# Patient Record
Sex: MALE | Race: WHITE | NOT HISPANIC OR LATINO | URBAN - METROPOLITAN AREA
[De-identification: names, ages, dates, MRNs, and addresses within clinical notes are randomized per-mention and may not be internally consistent; named-entity substitution may affect disease eponyms.]

---

## 2021-02-10 ENCOUNTER — TELEPHONE ENCOUNTER (OUTPATIENT)
Dept: URBAN - METROPOLITAN AREA CLINIC 113 | Facility: CLINIC | Age: 56
End: 2021-02-10

## 2021-02-10 VITALS — HEIGHT: 69 IN | WEIGHT: 190 LBS | BODY MASS INDEX: 28.14 KG/M2

## 2021-02-10 RX ORDER — PANTOPRAZOLE SODIUM 40 MG/1
1 TABLET TABLET, DELAYED RELEASE ORAL ONCE A DAY
Qty: 30 TABLET | Status: ACTIVE | COMMUNITY

## 2021-02-10 RX ORDER — METOPROLOL SUCCINATE 50 MG/1
1 TABLET TABLET, FILM COATED, EXTENDED RELEASE ORAL ONCE A DAY
Qty: 30 TABLET | Status: ACTIVE | COMMUNITY

## 2021-02-10 RX ORDER — LISINOPRIL 2.5 MG/1
1 TABLET TABLET ORAL BID
Qty: 60 TABLET | Status: ACTIVE | COMMUNITY

## 2021-02-10 RX ORDER — ATORVASTATIN CALCIUM 80 MG/1
1 TABLET TABLET, FILM COATED ORAL ONCE A DAY
Qty: 30 TABLET | Status: ACTIVE | COMMUNITY

## 2021-02-10 RX ORDER — FLUOXETINE 40 MG/1
1 CAPSULE CAPSULE ORAL ONCE A DAY
Qty: 30 CAPSULE | Status: ACTIVE | COMMUNITY

## 2021-02-10 RX ORDER — KRILL/OM-3/DHA/EPA/PHOSPHO/AST 1000-230MG
1 TABLET CAPSULE ORAL ONCE A DAY
Qty: 30 TABLET | Status: ACTIVE | COMMUNITY

## 2021-02-10 RX ORDER — SODIUM, POTASSIUM,MAG SULFATES 17.5-3.13G
354 ML SOLUTION, RECONSTITUTED, ORAL ORAL
Qty: 354 ML | Refills: 0 | OUTPATIENT
Start: 2021-02-10 | End: 2021-02-11

## 2021-02-10 RX ORDER — TICAGRELOR 90 MG/1
1 TABLET TABLET ORAL TWICE A DAY
Qty: 60 TABLET | Status: ACTIVE | COMMUNITY

## 2021-02-17 ENCOUNTER — OFFICE VISIT (OUTPATIENT)
Dept: URBAN - METROPOLITAN AREA SURGERY CENTER 25 | Facility: SURGERY CENTER | Age: 56
End: 2021-02-17
Payer: COMMERCIAL

## 2021-02-17 ENCOUNTER — CLAIMS CREATED FROM THE CLAIM WINDOW (OUTPATIENT)
Dept: URBAN - METROPOLITAN AREA CLINIC 4 | Facility: CLINIC | Age: 56
End: 2021-02-17
Payer: COMMERCIAL

## 2021-02-17 DIAGNOSIS — Z12.11 COLON CANCER SCREENING: ICD-10-CM

## 2021-02-17 DIAGNOSIS — K63.5 BENIGN COLON POLYP: ICD-10-CM

## 2021-02-17 DIAGNOSIS — K63.89 OTHER SPECIFIED DISEASES OF INTESTINE: ICD-10-CM

## 2021-02-17 PROCEDURE — 88305 TISSUE EXAM BY PATHOLOGIST: CPT | Performed by: PATHOLOGY

## 2021-02-17 PROCEDURE — 45380 COLONOSCOPY AND BIOPSY: CPT | Performed by: INTERNAL MEDICINE

## 2021-02-17 PROCEDURE — G8907 PT DOC NO EVENTS ON DISCHARG: HCPCS | Performed by: INTERNAL MEDICINE

## 2021-02-17 RX ORDER — ATORVASTATIN CALCIUM 80 MG/1
1 TABLET TABLET, FILM COATED ORAL ONCE A DAY
Qty: 30 TABLET | Status: ACTIVE | COMMUNITY

## 2021-02-17 RX ORDER — METOPROLOL SUCCINATE 50 MG/1
1 TABLET TABLET, FILM COATED, EXTENDED RELEASE ORAL ONCE A DAY
Qty: 30 TABLET | Status: ACTIVE | COMMUNITY

## 2021-02-17 RX ORDER — PANTOPRAZOLE SODIUM 40 MG/1
1 TABLET TABLET, DELAYED RELEASE ORAL ONCE A DAY
Qty: 30 TABLET | Status: ACTIVE | COMMUNITY

## 2021-02-17 RX ORDER — LISINOPRIL 2.5 MG/1
1 TABLET TABLET ORAL BID
Qty: 60 TABLET | Status: ACTIVE | COMMUNITY

## 2021-02-17 RX ORDER — TICAGRELOR 90 MG/1
1 TABLET TABLET ORAL TWICE A DAY
Qty: 60 TABLET | Status: ACTIVE | COMMUNITY

## 2021-02-17 RX ORDER — FLUOXETINE 40 MG/1
1 CAPSULE CAPSULE ORAL ONCE A DAY
Qty: 30 CAPSULE | Status: ACTIVE | COMMUNITY

## 2021-02-17 RX ORDER — KRILL/OM-3/DHA/EPA/PHOSPHO/AST 1000-230MG
1 TABLET CAPSULE ORAL ONCE A DAY
Qty: 30 TABLET | Status: ACTIVE | COMMUNITY

## 2021-03-31 ENCOUNTER — OFFICE VISIT (OUTPATIENT)
Dept: URBAN - METROPOLITAN AREA CLINIC 113 | Facility: CLINIC | Age: 56
End: 2021-03-31

## 2021-03-31 RX ORDER — LISINOPRIL 2.5 MG/1
1 TABLET TABLET ORAL BID
Qty: 60 TABLET | Status: ACTIVE | COMMUNITY

## 2021-03-31 RX ORDER — METOPROLOL SUCCINATE 50 MG/1
1 TABLET TABLET, FILM COATED, EXTENDED RELEASE ORAL ONCE A DAY
Qty: 30 TABLET | Status: ACTIVE | COMMUNITY

## 2021-03-31 RX ORDER — FLUOXETINE 40 MG/1
1 CAPSULE CAPSULE ORAL ONCE A DAY
Qty: 30 CAPSULE | Status: ACTIVE | COMMUNITY

## 2021-03-31 RX ORDER — PANTOPRAZOLE SODIUM 40 MG/1
1 TABLET TABLET, DELAYED RELEASE ORAL ONCE A DAY
Qty: 30 TABLET | Status: ACTIVE | COMMUNITY

## 2021-03-31 RX ORDER — TICAGRELOR 90 MG/1
1 TABLET TABLET ORAL TWICE A DAY
Qty: 60 TABLET | Status: ACTIVE | COMMUNITY

## 2021-03-31 RX ORDER — ATORVASTATIN CALCIUM 80 MG/1
1 TABLET TABLET, FILM COATED ORAL ONCE A DAY
Qty: 30 TABLET | Status: ACTIVE | COMMUNITY

## 2021-03-31 RX ORDER — KRILL/OM-3/DHA/EPA/PHOSPHO/AST 1000-230MG
1 TABLET CAPSULE ORAL ONCE A DAY
Qty: 30 TABLET | Status: ACTIVE | COMMUNITY

## 2022-04-01 ENCOUNTER — OFFICE VISIT (OUTPATIENT)
Dept: URBAN - METROPOLITAN AREA CLINIC 107 | Facility: CLINIC | Age: 57
End: 2022-04-01

## 2022-04-01 RX ORDER — METOPROLOL SUCCINATE 50 MG/1
1 TABLET TABLET, FILM COATED, EXTENDED RELEASE ORAL ONCE A DAY
Qty: 30 TABLET | Status: ACTIVE | COMMUNITY

## 2022-04-01 RX ORDER — LISINOPRIL 2.5 MG/1
1 TABLET TABLET ORAL BID
Qty: 60 TABLET | Status: ACTIVE | COMMUNITY

## 2022-04-01 RX ORDER — ATORVASTATIN CALCIUM 80 MG/1
1 TABLET TABLET, FILM COATED ORAL ONCE A DAY
Qty: 30 TABLET | Status: ACTIVE | COMMUNITY

## 2022-04-01 RX ORDER — PANTOPRAZOLE SODIUM 40 MG/1
1 TABLET TABLET, DELAYED RELEASE ORAL ONCE A DAY
Qty: 30 TABLET | Status: ACTIVE | COMMUNITY

## 2022-04-01 RX ORDER — FLUOXETINE 40 MG/1
1 CAPSULE CAPSULE ORAL ONCE A DAY
Qty: 30 CAPSULE | Status: ACTIVE | COMMUNITY

## 2022-04-01 RX ORDER — TICAGRELOR 90 MG/1
1 TABLET TABLET ORAL TWICE A DAY
Qty: 60 TABLET | Status: ACTIVE | COMMUNITY

## 2022-04-01 RX ORDER — KRILL/OM-3/DHA/EPA/PHOSPHO/AST 1000-230MG
1 TABLET CAPSULE ORAL ONCE A DAY
Qty: 30 TABLET | Status: ACTIVE | COMMUNITY

## 2022-04-01 NOTE — HPI-OTHER HISTORIES
Colonoscopy 2/17/2021:Diverticulosis in the sigmoid and descending colon.  Nonbleeding internal hemorrhoids.  Removal of one 4 mm polyp in the descending colon.  Pathology revealed hyperplastic polyps.  Repeat colonoscopy in 10 years for screening.

## 2022-04-01 NOTE — HPI-TODAY'S VISIT:
56-year-old male presents for evaluation of abdominal pain. Patient underwent umbilical hernia repair with Dr. Gibson in December 2021.  He saw Dr. Gibson on 2/18/2022 with complaints of returning epigastric pain that was present for 4 days surgery.  He suspected it was unrelated to his hernia repair and the patient requested a referral to GI.  Denies any NSAID use states the pain is worse when he is more active and less intense when he is sedentary.  He was referred to us for possible EGD. Labs 12/22/2021:Normal CBC, elevated glucose 123, normal GFR.

## 2022-04-05 ENCOUNTER — OFFICE VISIT (OUTPATIENT)
Dept: URBAN - METROPOLITAN AREA CLINIC 113 | Facility: CLINIC | Age: 57
End: 2022-04-05
Payer: COMMERCIAL

## 2022-04-05 ENCOUNTER — WEB ENCOUNTER (OUTPATIENT)
Dept: URBAN - METROPOLITAN AREA CLINIC 113 | Facility: CLINIC | Age: 57
End: 2022-04-05

## 2022-04-05 VITALS
RESPIRATION RATE: 20 BRPM | SYSTOLIC BLOOD PRESSURE: 137 MMHG | WEIGHT: 213 LBS | HEART RATE: 69 BPM | DIASTOLIC BLOOD PRESSURE: 83 MMHG | BODY MASS INDEX: 31.55 KG/M2 | TEMPERATURE: 97.3 F | HEIGHT: 69 IN

## 2022-04-05 DIAGNOSIS — K21.9 GASTROESOPHAGEAL REFLUX DISEASE, UNSPECIFIED WHETHER ESOPHAGITIS PRESENT: ICD-10-CM

## 2022-04-05 DIAGNOSIS — R10.13 EPIGASTRIC PAIN: ICD-10-CM

## 2022-04-05 PROBLEM — 235595009: Status: ACTIVE | Noted: 2022-04-05

## 2022-04-05 PROCEDURE — 33214 UPGRADE OF PACEMAKER SYSTEM: CPT | Performed by: INTERNAL MEDICINE

## 2022-04-05 PROCEDURE — 99214 OFFICE O/P EST MOD 30 MIN: CPT | Performed by: INTERNAL MEDICINE

## 2022-04-05 PROCEDURE — 99244 OFF/OP CNSLTJ NEW/EST MOD 40: CPT | Performed by: INTERNAL MEDICINE

## 2022-04-05 RX ORDER — SUCRALFATE 1 G
1 TABLET ON AN EMPTY STOMACH TABLET ORAL TWICE A DAY
Qty: 60 | Refills: 3 | OUTPATIENT
Start: 2022-04-05 | End: 2022-08-03

## 2022-04-05 RX ORDER — ATORVASTATIN CALCIUM 80 MG/1
1 TABLET TABLET, FILM COATED ORAL ONCE A DAY
Qty: 30 TABLET | Status: ACTIVE | COMMUNITY

## 2022-04-05 RX ORDER — TICAGRELOR 90 MG/1
1 TABLET TABLET ORAL TWICE A DAY
Qty: 60 TABLET | Status: ACTIVE | COMMUNITY

## 2022-04-05 RX ORDER — METOPROLOL SUCCINATE 50 MG/1
1 TABLET TABLET, FILM COATED, EXTENDED RELEASE ORAL ONCE A DAY
Qty: 30 TABLET | Status: ACTIVE | COMMUNITY

## 2022-04-05 RX ORDER — FLUOXETINE 40 MG/1
1 CAPSULE CAPSULE ORAL ONCE A DAY
Qty: 30 CAPSULE | Status: ON HOLD | COMMUNITY

## 2022-04-05 RX ORDER — KRILL/OM-3/DHA/EPA/PHOSPHO/AST 1000-230MG
1 TABLET CAPSULE ORAL ONCE A DAY
Qty: 30 TABLET | Status: ACTIVE | COMMUNITY

## 2022-04-05 RX ORDER — PANTOPRAZOLE SODIUM 40 MG/1
1 TABLET TABLET, DELAYED RELEASE ORAL ONCE A DAY
OUTPATIENT

## 2022-04-05 RX ORDER — LISINOPRIL 2.5 MG/1
1 TABLET TABLET ORAL BID
Qty: 60 TABLET | Status: ON HOLD | COMMUNITY

## 2022-04-05 RX ORDER — PANTOPRAZOLE SODIUM 40 MG/1
1 TABLET TABLET, DELAYED RELEASE ORAL ONCE A DAY
Qty: 30 TABLET | Status: ACTIVE | COMMUNITY

## 2022-04-05 NOTE — HPI-TODAY'S VISIT:
56-year-old male with a history of diverticulosis, internal hemorrhoids, and CAD with prior MI s/p stent placements on Brillinta is referred by Esme Robin NP for evaluation of abdominal pain. A copy of today's visit will be forwarded to the referring provider.  Patient underwent umbilical hernia and epigastric abdominal hernia repair with Dr. Gibson in December 2021.  He saw Dr. Gibson on 2/18/2022 with complaints of returning epigastric pain that was present for 4 days surgery.  He suspected it was unrelated to his hernia repair and the patient requested a referral to GI.  Denies any NSAID use states the pain is worse when he is more active and less intense when he is sedentary.  He was referred to us for possible EGD.  Labs 12/22/2021:Normal CBC, elevated glucose 123, normal GFR.   Patient admits to epigastric pain ongoing for a year which he prompted him to see Dr. Gibson. His epigastric pain has persisted despite his hernia repair. The pain used to be intermittent and is now constant. The pain will present as a dull, "achey" or sharp sensation. The pain worsens when sitting upright and improves while lying supine. Pain is reproduced upon palpation. Pain also worsens after eating. He has not tried heat or ice application. He denies any associated nausea or vomiting. He does have heartburn which is well controlled on Pantoprazole 40 mg once daily. He denies excessive NSAID use. He denies dysphagia. Bowel movements are regular and daily. No blood per rectum or melena. He denies any family history of colon cancer or other GI malignancy such as colon cancer.  He does have a history of MI s/p stent placement x 5 in 2020. He has not had a cardiovascular evaluation though he denies worsening of pain with exercise, SOB, chest pain, palpitations or syncope.

## 2022-05-24 ENCOUNTER — TELEPHONE ENCOUNTER (OUTPATIENT)
Dept: URBAN - METROPOLITAN AREA CLINIC 113 | Facility: CLINIC | Age: 57
End: 2022-05-24

## 2022-05-31 ENCOUNTER — OFFICE VISIT (OUTPATIENT)
Dept: URBAN - METROPOLITAN AREA CLINIC 113 | Facility: CLINIC | Age: 57
End: 2022-05-31

## 2022-05-31 RX ORDER — FLUOXETINE 40 MG/1
1 CAPSULE CAPSULE ORAL ONCE A DAY
Qty: 30 CAPSULE | Status: ON HOLD | COMMUNITY

## 2022-05-31 RX ORDER — LISINOPRIL 2.5 MG/1
1 TABLET TABLET ORAL BID
Qty: 60 TABLET | Status: ON HOLD | COMMUNITY

## 2022-05-31 RX ORDER — METOPROLOL SUCCINATE 50 MG/1
1 TABLET TABLET, FILM COATED, EXTENDED RELEASE ORAL ONCE A DAY
Qty: 30 TABLET | Status: ACTIVE | COMMUNITY

## 2022-05-31 RX ORDER — KRILL/OM-3/DHA/EPA/PHOSPHO/AST 1000-230MG
1 TABLET CAPSULE ORAL ONCE A DAY
Qty: 30 TABLET | Status: ACTIVE | COMMUNITY

## 2022-05-31 RX ORDER — TICAGRELOR 90 MG/1
1 TABLET TABLET ORAL TWICE A DAY
Qty: 60 TABLET | Status: ACTIVE | COMMUNITY

## 2022-05-31 RX ORDER — ATORVASTATIN CALCIUM 80 MG/1
1 TABLET TABLET, FILM COATED ORAL ONCE A DAY
Qty: 30 TABLET | Status: ACTIVE | COMMUNITY

## 2022-05-31 RX ORDER — PANTOPRAZOLE SODIUM 40 MG/1
1 TABLET TABLET, DELAYED RELEASE ORAL ONCE A DAY
Status: ACTIVE | COMMUNITY

## 2022-05-31 RX ORDER — SUCRALFATE 1 G
1 TABLET ON AN EMPTY STOMACH TABLET ORAL TWICE A DAY
Qty: 60 | Refills: 3 | Status: ACTIVE | COMMUNITY
Start: 2022-04-05 | End: 2022-08-03

## 2022-05-31 NOTE — HPI-TODAY'S VISIT:
56-year-old male with a history of diverticulosis, internal hemorrhoids, and CAD with prior MI status post stent placements on Brilinta presents for follow-up.  He was last seen on 4/5/2022 as a referral for abdominal pain.  He reported epigastric pain ongoing for almost 1 year, unchanged by abdominal hernia repair.  Pain changed with position and reproduced upon palpation which suggested musculoskeletal etiology however due to chronicity he was planned for CT scan to rule out other intra-abdominal pathology.  He was trialed on Carafate for hopeful symptomatic relief of pain in the interim.  If pain improves, this suggests a possible acid peptic disorder however this was not high in the differential.  If pain persists, we would consider an EGD for evaluation of other gastroesophageal pathology. It appears the CT scan order was never faxed to [facility].   4/5/22: 56-year-old male with a history of diverticulosis, internal hemorrhoids, and CAD with prior MI s/p stent placements on Brillinta is referred by Esme Robin NP for evaluation of abdominal pain. A copy of today's visit will be forwarded to the referring provider.  Patient underwent umbilical hernia and epigastric abdominal hernia repair with Dr. Gibson in December 2021.  He saw Dr. Gibson on 2/18/2022 with complaints of returning epigastric pain that was present for 4 days surgery.  He suspected it was unrelated to his hernia repair and the patient requested a referral to GI.  Denies any NSAID use states the pain is worse when he is more active and less intense when he is sedentary.  He was referred to us for possible EGD.  Patient admits to epigastric pain ongoing for a year which he prompted him to see Dr. Gibson. His epigastric pain has persisted despite his hernia repair. The pain used to be intermittent and is now constant. The pain will present as a dull, "achey" or sharp sensation. The pain worsens when sitting upright and improves while lying supine. Pain is reproduced upon palpation. Pain also worsens after eating. He has not tried heat or ice application. He denies any associated nausea or vomiting. He does have heartburn which is well controlled on Pantoprazole 40 mg once daily. He denies excessive NSAID use. He denies dysphagia. Bowel movements are regular and daily. No blood per rectum or melena. He denies any family history of colon cancer or other GI malignancy such as colon cancer.  He does have a history of MI s/p stent placement x 5 in 2020. He has not had a cardiovascular evaluation though he denies worsening of pain with exercise, SOB, chest pain, palpitations or syncope.

## 2022-05-31 NOTE — HPI-OTHER HISTORIES
Labs 12/22/2021:Normal CBC, elevated glucose 123, normal GFR.   Colonoscopy 2/17/2021: Diverticulosis in the sigmoid and descending colon.  Nonbleeding internal hemorrhoids.  Removal of one 4 mm polyp in the descending colon.  Pathology revealed hyperplastic polyps.  Repeat colonoscopy in 10 years for screening.

## 2022-11-15 ENCOUNTER — TELEPHONE ENCOUNTER (OUTPATIENT)
Dept: URBAN - METROPOLITAN AREA CLINIC 113 | Facility: CLINIC | Age: 57
End: 2022-11-15

## 2023-07-31 ENCOUNTER — OFFICE VISIT (OUTPATIENT)
Dept: URBAN - METROPOLITAN AREA CLINIC 113 | Facility: CLINIC | Age: 58
End: 2023-07-31
Payer: COMMERCIAL

## 2023-07-31 VITALS
DIASTOLIC BLOOD PRESSURE: 73 MMHG | BODY MASS INDEX: 31.99 KG/M2 | TEMPERATURE: 97.5 F | HEIGHT: 69 IN | SYSTOLIC BLOOD PRESSURE: 121 MMHG | HEART RATE: 76 BPM | WEIGHT: 216 LBS | RESPIRATION RATE: 16 BRPM

## 2023-07-31 DIAGNOSIS — R10.13 EPIGASTRIC PAIN: ICD-10-CM

## 2023-07-31 DIAGNOSIS — Z12.11 COLON CANCER SCREENING: ICD-10-CM

## 2023-07-31 DIAGNOSIS — K21.9 GASTROESOPHAGEAL REFLUX DISEASE, UNSPECIFIED WHETHER ESOPHAGITIS PRESENT: ICD-10-CM

## 2023-07-31 DIAGNOSIS — K63.89 OTHER SPECIFIED DISEASES OF INTESTINE: ICD-10-CM

## 2023-07-31 PROCEDURE — 99214 OFFICE O/P EST MOD 30 MIN: CPT | Performed by: INTERNAL MEDICINE

## 2023-07-31 RX ORDER — FAMOTIDINE 40 MG/1
1 TABLET AT BEDTIME TABLET, FILM COATED ORAL ONCE A DAY
Qty: 30 | OUTPATIENT
Start: 2023-07-31

## 2023-07-31 RX ORDER — KRILL/OM-3/DHA/EPA/PHOSPHO/AST 1000-230MG
1 TABLET CAPSULE ORAL ONCE A DAY
Qty: 30 TABLET | Status: ON HOLD | COMMUNITY

## 2023-07-31 RX ORDER — TAMSULOSIN HYDROCHLORIDE 0.4 MG/1
1 CAPSULE CAPSULE ORAL ONCE A DAY
Status: ACTIVE | COMMUNITY

## 2023-07-31 RX ORDER — FLUOXETINE 40 MG/1
1 CAPSULE CAPSULE ORAL ONCE A DAY
Qty: 30 CAPSULE | Status: ON HOLD | COMMUNITY

## 2023-07-31 RX ORDER — TICAGRELOR 90 MG/1
1 TABLET TABLET ORAL TWICE A DAY
Qty: 60 TABLET | Status: ACTIVE | COMMUNITY

## 2023-07-31 RX ORDER — LISINOPRIL 2.5 MG/1
1 TABLET TABLET ORAL BID
Qty: 60 TABLET | Status: ON HOLD | COMMUNITY

## 2023-07-31 RX ORDER — ATORVASTATIN CALCIUM 80 MG/1
1 TABLET TABLET, FILM COATED ORAL ONCE A DAY
Qty: 30 TABLET | Status: ACTIVE | COMMUNITY

## 2023-07-31 RX ORDER — PANTOPRAZOLE SODIUM 40 MG/1
1 TABLET TABLET, DELAYED RELEASE ORAL ONCE A DAY
OUTPATIENT

## 2023-07-31 RX ORDER — PANTOPRAZOLE SODIUM 40 MG/1
1 TABLET TABLET, DELAYED RELEASE ORAL ONCE A DAY
Status: ACTIVE | COMMUNITY

## 2023-07-31 RX ORDER — BUPROPION HYDROCHLORIDE 150 MG/1
1 TABLET IN THE MORNING TABLET, FILM COATED, EXTENDED RELEASE ORAL ONCE A DAY
Status: ACTIVE | COMMUNITY

## 2023-07-31 RX ORDER — METOPROLOL SUCCINATE 50 MG/1
1 TABLET TABLET, FILM COATED, EXTENDED RELEASE ORAL ONCE A DAY
Qty: 30 TABLET | Status: ACTIVE | COMMUNITY

## 2023-07-31 NOTE — HPI-TODAY'S VISIT:
56 y/o male presenting for f/u. He was last seen in May 2022. He has a hx of hemorrhoids, diverticulosis, CAD s/p CSC in Feb 2021 and found to have a TA. He also has a hx of GERD and intermittent indigestion. He has been taking pantoprazole once daily before bed and feels like they arent working as wel.   5/31/22 56-year-old male with a history of diverticulosis, internal hemorrhoids, and CAD with prior MI status post stent placements on Brilinta presents for follow-up.  He was last seen on 4/5/2022 as a referral for abdominal pain.  He reported epigastric pain ongoing for almost 1 year, unchanged by abdominal hernia repair.  Pain changed with position and reproduced upon palpation which suggested musculoskeletal etiology however due to chronicity he was planned for CT scan to rule out other intra-abdominal pathology.  He was trialed on Carafate for hopeful symptomatic relief of pain in the interim.  If pain improves, this suggests a possible acid peptic disorder however this was not high in the differential.  If pain persists, we would consider an EGD for evaluation of other gastroesophageal pathology. It appears the CT scan order was never faxed to [facility].   4/5/22: 56-year-old male with a history of diverticulosis, internal hemorrhoids, and CAD with prior MI s/p stent placements on Brillinta is referred by Esme Robin NP for evaluation of abdominal pain. A copy of today's visit will be forwarded to the referring provider.  Patient underwent umbilical hernia and epigastric abdominal hernia repair with Dr. Gibson in December 2021.  He saw Dr. Gibson on 2/18/2022 with complaints of returning epigastric pain that was present for 4 days surgery.  He suspected it was unrelated to his hernia repair and the patient requested a referral to GI.  Denies any NSAID use states the pain is worse when he is more active and less intense when he is sedentary.  He was referred to us for possible EGD.  Patient admits to epigastric pain ongoing for a year which he prompted him to see Dr. Gibson. His epigastric pain has persisted despite his hernia repair. The pain used to be intermittent and is now constant. The pain will present as a dull, "achey" or sharp sensation. The pain worsens when sitting upright and improves while lying supine. Pain is reproduced upon palpation. Pain also worsens after eating. He has not tried heat or ice application. He denies any associated nausea or vomiting. He does have heartburn which is well controlled on Pantoprazole 40 mg once daily. He denies excessive NSAID use. He denies dysphagia. Bowel movements are regular and daily. No blood per rectum or melena. He denies any family history of colon cancer or other GI malignancy such as colon cancer.  He does have a history of MI s/p stent placement x 5 in 2020. He has not had a cardiovascular evaluation though he denies worsening of pain with exercise, SOB, chest pain, palpitations or syncope.

## 2023-08-21 ENCOUNTER — TELEPHONE ENCOUNTER (OUTPATIENT)
Dept: URBAN - METROPOLITAN AREA CLINIC 113 | Facility: CLINIC | Age: 58
End: 2023-08-21

## 2023-08-23 ENCOUNTER — OFFICE VISIT (OUTPATIENT)
Dept: URBAN - METROPOLITAN AREA SURGERY CENTER 25 | Facility: SURGERY CENTER | Age: 58
End: 2023-08-23

## 2023-08-30 ENCOUNTER — ERX REFILL RESPONSE (OUTPATIENT)
Dept: URBAN - METROPOLITAN AREA CLINIC 113 | Facility: CLINIC | Age: 58
End: 2023-08-30

## 2023-08-30 RX ORDER — FAMOTIDINE 40 MG/1
1 TABLET AT BEDTIME TABLET, FILM COATED ORAL ONCE A DAY
Qty: 30 | OUTPATIENT

## 2023-08-30 RX ORDER — FAMOTIDINE 40 MG/1
TAKE 1 TABLET BY MOUTH EVERY DAY AT BEDTIME FOR 30 DAYS TABLET, FILM COATED ORAL
Qty: 30 TABLET | Refills: 0 | OUTPATIENT

## 2023-09-07 ENCOUNTER — TELEPHONE ENCOUNTER (OUTPATIENT)
Dept: URBAN - METROPOLITAN AREA CLINIC 113 | Facility: CLINIC | Age: 58
End: 2023-09-07

## 2023-09-11 ENCOUNTER — OFFICE VISIT (OUTPATIENT)
Dept: URBAN - METROPOLITAN AREA SURGERY CENTER 25 | Facility: SURGERY CENTER | Age: 58
End: 2023-09-11

## 2023-09-27 ENCOUNTER — TELEPHONE ENCOUNTER (OUTPATIENT)
Dept: URBAN - METROPOLITAN AREA CLINIC 113 | Facility: CLINIC | Age: 58
End: 2023-09-27

## 2023-09-27 RX ORDER — FAMOTIDINE 40 MG/1
TAKE 1 TABLET BY MOUTH EVERY DAY AT BEDTIME FOR 30 DAYS TABLET, FILM COATED ORAL ONCE A DAY
Qty: 90 | Refills: 1

## 2023-10-04 ENCOUNTER — OFFICE VISIT (OUTPATIENT)
Dept: URBAN - METROPOLITAN AREA CLINIC 113 | Facility: CLINIC | Age: 58
End: 2023-10-04

## 2023-10-11 ENCOUNTER — ERX REFILL RESPONSE (OUTPATIENT)
Dept: URBAN - METROPOLITAN AREA CLINIC 113 | Facility: CLINIC | Age: 58
End: 2023-10-11

## 2023-10-11 RX ORDER — FAMOTIDINE 40 MG/1
TAKE 1 TABLET BY MOUTH EVERY DAY AT BEDTIME FOR 30 DAYS TABLET, FILM COATED ORAL ONCE A DAY
Qty: 90 | Refills: 1 | OUTPATIENT

## 2023-10-16 ENCOUNTER — OFFICE VISIT (OUTPATIENT)
Dept: URBAN - METROPOLITAN AREA SURGERY CENTER 25 | Facility: SURGERY CENTER | Age: 58
End: 2023-10-16
Payer: COMMERCIAL

## 2023-10-16 ENCOUNTER — CLAIMS CREATED FROM THE CLAIM WINDOW (OUTPATIENT)
Dept: URBAN - METROPOLITAN AREA SURGERY CENTER 25 | Facility: SURGERY CENTER | Age: 58
End: 2023-10-16

## 2023-10-16 ENCOUNTER — CLAIMS CREATED FROM THE CLAIM WINDOW (OUTPATIENT)
Dept: URBAN - METROPOLITAN AREA CLINIC 4 | Facility: CLINIC | Age: 58
End: 2023-10-16
Payer: COMMERCIAL

## 2023-10-16 DIAGNOSIS — K29.70 GASTRITIS, UNSPECIFIED, WITHOUT BLEEDING: ICD-10-CM

## 2023-10-16 DIAGNOSIS — K31.89 ACHYLIA: ICD-10-CM

## 2023-10-16 DIAGNOSIS — R10.13 EPIGASTRIC PAIN: ICD-10-CM

## 2023-10-16 DIAGNOSIS — R10.13 ABDOMINAL DISCOMFORT, EPIGASTRIC: ICD-10-CM

## 2023-10-16 DIAGNOSIS — K31.A0 GASTRIC INTESTINAL METAPLASIA, UNSPECIFIED: ICD-10-CM

## 2023-10-16 PROCEDURE — 88305 TISSUE EXAM BY PATHOLOGIST: CPT | Performed by: PATHOLOGY

## 2023-10-16 PROCEDURE — 43239 EGD BIOPSY SINGLE/MULTIPLE: CPT | Performed by: INTERNAL MEDICINE

## 2023-10-16 PROCEDURE — 00731 ANES UPR GI NDSC PX NOS: CPT | Performed by: ANESTHESIOLOGY

## 2023-10-16 PROCEDURE — 88342 IMHCHEM/IMCYTCHM 1ST ANTB: CPT | Performed by: PATHOLOGY

## 2023-10-16 PROCEDURE — 00731 ANES UPR GI NDSC PX NOS: CPT | Performed by: ANESTHESIOLOGIST ASSISTANT

## 2023-10-16 PROCEDURE — G8907 PT DOC NO EVENTS ON DISCHARG: HCPCS | Performed by: INTERNAL MEDICINE

## 2023-10-16 RX ORDER — FAMOTIDINE 40 MG/1
TAKE 1 TABLET BY MOUTH EVERY DAY AT BEDTIME FOR 30 DAYS TABLET, FILM COATED ORAL ONCE A DAY
Qty: 90 | Refills: 1 | Status: ACTIVE | COMMUNITY

## 2023-10-16 RX ORDER — KRILL/OM-3/DHA/EPA/PHOSPHO/AST 1000-230MG
1 TABLET CAPSULE ORAL ONCE A DAY
Qty: 30 TABLET | Status: ON HOLD | COMMUNITY

## 2023-10-16 RX ORDER — TAMSULOSIN HYDROCHLORIDE 0.4 MG/1
1 CAPSULE CAPSULE ORAL ONCE A DAY
Status: ACTIVE | COMMUNITY

## 2023-10-16 RX ORDER — TICAGRELOR 90 MG/1
1 TABLET TABLET ORAL TWICE A DAY
Qty: 60 TABLET | Status: ACTIVE | COMMUNITY

## 2023-10-16 RX ORDER — PANTOPRAZOLE SODIUM 40 MG/1
1 TABLET TABLET, DELAYED RELEASE ORAL ONCE A DAY
Status: ACTIVE | COMMUNITY

## 2023-10-16 RX ORDER — FLUOXETINE 40 MG/1
1 CAPSULE CAPSULE ORAL ONCE A DAY
Qty: 30 CAPSULE | Status: ON HOLD | COMMUNITY

## 2023-10-16 RX ORDER — LISINOPRIL 2.5 MG/1
1 TABLET TABLET ORAL BID
Qty: 60 TABLET | Status: ON HOLD | COMMUNITY

## 2023-10-16 RX ORDER — BUPROPION HYDROCHLORIDE 150 MG/1
1 TABLET IN THE MORNING TABLET, FILM COATED, EXTENDED RELEASE ORAL ONCE A DAY
Status: ACTIVE | COMMUNITY

## 2023-10-16 RX ORDER — ATORVASTATIN CALCIUM 80 MG/1
1 TABLET TABLET, FILM COATED ORAL ONCE A DAY
Qty: 30 TABLET | Status: ACTIVE | COMMUNITY

## 2023-10-16 RX ORDER — METOPROLOL SUCCINATE 50 MG/1
1 TABLET TABLET, FILM COATED, EXTENDED RELEASE ORAL ONCE A DAY
Qty: 30 TABLET | Status: ACTIVE | COMMUNITY

## 2023-10-18 ENCOUNTER — OFFICE VISIT (OUTPATIENT)
Dept: URBAN - METROPOLITAN AREA CLINIC 113 | Facility: CLINIC | Age: 58
End: 2023-10-18
Payer: COMMERCIAL

## 2023-10-18 ENCOUNTER — DASHBOARD ENCOUNTERS (OUTPATIENT)
Age: 58
End: 2023-10-18

## 2023-10-18 ENCOUNTER — TELEPHONE ENCOUNTER (OUTPATIENT)
Dept: URBAN - METROPOLITAN AREA CLINIC 113 | Facility: CLINIC | Age: 58
End: 2023-10-18

## 2023-10-18 VITALS
HEIGHT: 69 IN | RESPIRATION RATE: 16 BRPM | SYSTOLIC BLOOD PRESSURE: 130 MMHG | BODY MASS INDEX: 31.55 KG/M2 | HEART RATE: 71 BPM | WEIGHT: 213 LBS | TEMPERATURE: 97.5 F | DIASTOLIC BLOOD PRESSURE: 77 MMHG

## 2023-10-18 DIAGNOSIS — R10.13 EPIGASTRIC PAIN: ICD-10-CM

## 2023-10-18 DIAGNOSIS — Z12.11 COLON CANCER SCREENING: ICD-10-CM

## 2023-10-18 DIAGNOSIS — K63.89 OTHER SPECIFIED DISEASES OF INTESTINE: ICD-10-CM

## 2023-10-18 DIAGNOSIS — K21.9 GASTROESOPHAGEAL REFLUX DISEASE, UNSPECIFIED WHETHER ESOPHAGITIS PRESENT: ICD-10-CM

## 2023-10-18 PROCEDURE — 99214 OFFICE O/P EST MOD 30 MIN: CPT | Performed by: INTERNAL MEDICINE

## 2023-10-18 RX ORDER — FLUOXETINE 40 MG/1
1 CAPSULE CAPSULE ORAL ONCE A DAY
Qty: 30 CAPSULE | Status: ON HOLD | COMMUNITY

## 2023-10-18 RX ORDER — PANTOPRAZOLE SODIUM 40 MG/1
1 TABLET TABLET, DELAYED RELEASE ORAL ONCE A DAY
OUTPATIENT

## 2023-10-18 RX ORDER — BUPROPION HYDROCHLORIDE 150 MG/1
1 TABLET IN THE MORNING TABLET, FILM COATED, EXTENDED RELEASE ORAL ONCE A DAY
Status: ACTIVE | COMMUNITY

## 2023-10-18 RX ORDER — TAMSULOSIN HYDROCHLORIDE 0.4 MG/1
1 CAPSULE CAPSULE ORAL ONCE A DAY
Status: ACTIVE | COMMUNITY

## 2023-10-18 RX ORDER — KRILL/OM-3/DHA/EPA/PHOSPHO/AST 1000-230MG
1 TABLET CAPSULE ORAL ONCE A DAY
Qty: 30 TABLET | Status: ON HOLD | COMMUNITY

## 2023-10-18 RX ORDER — FAMOTIDINE 40 MG/1
TAKE 1 TABLET BY MOUTH EVERY DAY AT BEDTIME FOR 30 DAYS TABLET, FILM COATED ORAL ONCE A DAY
Qty: 90 | Refills: 1 | Status: ACTIVE | COMMUNITY

## 2023-10-18 RX ORDER — FAMOTIDINE 40 MG/1
1 TABLET AT BEDTIME TABLET, FILM COATED ORAL ONCE A DAY
Qty: 30 | OUTPATIENT

## 2023-10-18 RX ORDER — ATORVASTATIN CALCIUM 80 MG/1
1 TABLET TABLET, FILM COATED ORAL ONCE A DAY
Qty: 30 TABLET | Status: ACTIVE | COMMUNITY

## 2023-10-18 RX ORDER — METOPROLOL SUCCINATE 50 MG/1
1 TABLET TABLET, FILM COATED, EXTENDED RELEASE ORAL ONCE A DAY
Qty: 30 TABLET | Status: ACTIVE | COMMUNITY

## 2023-10-18 RX ORDER — LISINOPRIL 2.5 MG/1
1 TABLET TABLET ORAL BID
Qty: 60 TABLET | Status: ON HOLD | COMMUNITY

## 2023-10-18 RX ORDER — TICAGRELOR 90 MG/1
1 TABLET TABLET ORAL TWICE A DAY
Qty: 60 TABLET | Status: ACTIVE | COMMUNITY

## 2023-10-18 RX ORDER — PANTOPRAZOLE SODIUM 40 MG/1
1 TABLET TABLET, DELAYED RELEASE ORAL ONCE A DAY
Status: ACTIVE | COMMUNITY

## 2023-10-18 NOTE — HPI-TODAY'S VISIT:
57 y/o male presenting for f/u. He does have a hx of GERD. He was placed on famotidine and pantoprazole. His symptoms are improved. He had an EGD and was found to have gastritis. Bx are not back yet.   7/2023 56 y/o male presenting for f/u. He was last seen in May 2022. He has a hx of hemorrhoids, diverticulosis, CAD s/p CSC in Feb 2021 and found to have a TA. He also has a hx of GERD and intermittent indigestion. He has been taking pantoprazole once daily before bed and feels like they arent working as wel.   5/31/22 56-year-old male with a history of diverticulosis, internal hemorrhoids, and CAD with prior MI status post stent placements on Brilinta presents for follow-up.  He was last seen on 4/5/2022 as a referral for abdominal pain.  He reported epigastric pain ongoing for almost 1 year, unchanged by abdominal hernia repair.  Pain changed with position and reproduced upon palpation which suggested musculoskeletal etiology however due to chronicity he was planned for CT scan to rule out other intra-abdominal pathology.  He was trialed on Carafate for hopeful symptomatic relief of pain in the interim.  If pain improves, this suggests a possible acid peptic disorder however this was not high in the differential.  If pain persists, we would consider an EGD for evaluation of other gastroesophageal pathology. It appears the CT scan order was never faxed to [facility].   4/5/22: 56-year-old male with a history of diverticulosis, internal hemorrhoids, and CAD with prior MI s/p stent placements on Brillinta is referred by Esme Robin NP for evaluation of abdominal pain. A copy of today's visit will be forwarded to the referring provider.  Patient underwent umbilical hernia and epigastric abdominal hernia repair with Dr. Gibson in December 2021.  He saw Dr. Gibson on 2/18/2022 with complaints of returning epigastric pain that was present for 4 days surgery.  He suspected it was unrelated to his hernia repair and the patient requested a referral to GI.  Denies any NSAID use states the pain is worse when he is more active and less intense when he is sedentary.  He was referred to us for possible EGD.  Patient is without any abdominal complaints. No dysphagia, heartburn, regurgitation, unintentional weight loss, nausea, vomiting, hematemesis or melena. Bowels are moving regularly without blood per rectum. No complaints of bloating. No jaundice, icterus.   Patient admits to epigastric pain ongoing for a year which he prompted him to see Dr. Gibson. His epigastric pain has persisted despite his hernia repair. The pain used to be intermittent and is now constant. The pain will present as a dull, "achey" or sharp sensation. The pain worsens when sitting upright and improves while lying supine. Pain is reproduced upon palpation. Pain also worsens after eating. He has not tried heat or ice application. He denies any associated nausea or vomiting. He does have heartburn which is well controlled on Pantoprazole 40 mg once daily. He denies excessive NSAID use. He denies dysphagia. Bowel movements are regular and daily. No blood per rectum or melena. He denies any family history of colon cancer or other GI malignancy such as colon cancer.  He does have a history of MI s/p stent placement x 5 in 2020. He has not had a cardiovascular evaluation though he denies worsening of pain with exercise, SOB, chest pain, palpitations or syncope.

## 2023-10-27 ENCOUNTER — OFFICE VISIT (OUTPATIENT)
Dept: URBAN - METROPOLITAN AREA CLINIC 113 | Facility: CLINIC | Age: 58
End: 2023-10-27

## 2024-01-18 ENCOUNTER — ERX REFILL RESPONSE (OUTPATIENT)
Dept: URBAN - METROPOLITAN AREA CLINIC 113 | Facility: CLINIC | Age: 59
End: 2024-01-18

## 2024-01-18 RX ORDER — FAMOTIDINE 40 MG/1
TAKE 1 TABLET BY MOUTH EVERY DAY AT BEDTIME FOR 30 DAYS ORALLY ONCE A DAY 90 DAYS TABLET, FILM COATED ORAL
Qty: 90 TABLET | Refills: 2 | OUTPATIENT

## 2024-01-18 RX ORDER — FAMOTIDINE 40 MG/1
TAKE 1 TABLET BY MOUTH EVERY DAY AT BEDTIME FOR 30 DAYS ORALLY ONCE A DAY 90 DAYS TABLET, FILM COATED ORAL
Qty: 90 TABLET | Refills: 3 | OUTPATIENT

## 2025-03-28 ENCOUNTER — ERX REFILL RESPONSE (OUTPATIENT)
Dept: URBAN - METROPOLITAN AREA CLINIC 113 | Facility: CLINIC | Age: 60
End: 2025-03-28

## 2025-03-28 RX ORDER — FAMOTIDINE 40 MG/1
TAKE 1 TABLET BY MOUTH EVERY DAY AT BEDTIME FOR 30 DAYS ORALLY ONCE A DAY 90 DAYS TABLET, FILM COATED ORAL
Qty: 90 TABLET | Refills: 3 | OUTPATIENT